# Patient Record
Sex: MALE | Race: OTHER | HISPANIC OR LATINO | URBAN - METROPOLITAN AREA
[De-identification: names, ages, dates, MRNs, and addresses within clinical notes are randomized per-mention and may not be internally consistent; named-entity substitution may affect disease eponyms.]

---

## 2019-03-18 ENCOUNTER — EMERGENCY (EMERGENCY)
Facility: HOSPITAL | Age: 29
LOS: 1 days | Discharge: HOME | End: 2019-03-18
Admitting: EMERGENCY MEDICINE
Payer: COMMERCIAL

## 2019-03-18 VITALS
OXYGEN SATURATION: 98 % | RESPIRATION RATE: 17 BRPM | SYSTOLIC BLOOD PRESSURE: 133 MMHG | WEIGHT: 259.93 LBS | DIASTOLIC BLOOD PRESSURE: 79 MMHG | TEMPERATURE: 98 F | HEART RATE: 65 BPM

## 2019-03-18 LAB
ALBUMIN SERPL ELPH-MCNC: 4.8 G/DL — SIGNIFICANT CHANGE UP (ref 3.3–5)
ALP SERPL-CCNC: 76 U/L — SIGNIFICANT CHANGE UP (ref 40–120)
ALT FLD-CCNC: 45 U/L — SIGNIFICANT CHANGE UP (ref 10–45)
ANION GAP SERPL CALC-SCNC: 12 MMOL/L — SIGNIFICANT CHANGE UP (ref 5–17)
AST SERPL-CCNC: 30 U/L — SIGNIFICANT CHANGE UP (ref 10–40)
BASOPHILS # BLD AUTO: 0.03 K/UL — SIGNIFICANT CHANGE UP (ref 0–0.2)
BASOPHILS NFR BLD AUTO: 0.4 % — SIGNIFICANT CHANGE UP (ref 0–2)
BILIRUB SERPL-MCNC: 0.9 MG/DL — SIGNIFICANT CHANGE UP (ref 0.2–1.2)
BUN SERPL-MCNC: 10 MG/DL — SIGNIFICANT CHANGE UP (ref 7–23)
CALCIUM SERPL-MCNC: 9.7 MG/DL — SIGNIFICANT CHANGE UP (ref 8.4–10.5)
CHLORIDE SERPL-SCNC: 104 MMOL/L — SIGNIFICANT CHANGE UP (ref 96–108)
CO2 SERPL-SCNC: 27 MMOL/L — SIGNIFICANT CHANGE UP (ref 22–31)
CREAT SERPL-MCNC: 1.23 MG/DL — SIGNIFICANT CHANGE UP (ref 0.5–1.3)
EOSINOPHIL # BLD AUTO: 0.29 K/UL — SIGNIFICANT CHANGE UP (ref 0–0.5)
EOSINOPHIL NFR BLD AUTO: 3.6 % — SIGNIFICANT CHANGE UP (ref 0–6)
GLUCOSE SERPL-MCNC: 98 MG/DL — SIGNIFICANT CHANGE UP (ref 70–99)
HCT VFR BLD CALC: 49.9 % — SIGNIFICANT CHANGE UP (ref 39–50)
HGB BLD-MCNC: 16.9 G/DL — SIGNIFICANT CHANGE UP (ref 13–17)
IMM GRANULOCYTES NFR BLD AUTO: 0.5 % — SIGNIFICANT CHANGE UP (ref 0–1.5)
LYMPHOCYTES # BLD AUTO: 2.47 K/UL — SIGNIFICANT CHANGE UP (ref 1–3.3)
LYMPHOCYTES # BLD AUTO: 30.6 % — SIGNIFICANT CHANGE UP (ref 13–44)
MCHC RBC-ENTMCNC: 30.1 PG — SIGNIFICANT CHANGE UP (ref 27–34)
MCHC RBC-ENTMCNC: 33.9 GM/DL — SIGNIFICANT CHANGE UP (ref 32–36)
MCV RBC AUTO: 88.8 FL — SIGNIFICANT CHANGE UP (ref 80–100)
MONOCYTES # BLD AUTO: 0.62 K/UL — SIGNIFICANT CHANGE UP (ref 0–0.9)
MONOCYTES NFR BLD AUTO: 7.7 % — SIGNIFICANT CHANGE UP (ref 2–14)
NEUTROPHILS # BLD AUTO: 4.61 K/UL — SIGNIFICANT CHANGE UP (ref 1.8–7.4)
NEUTROPHILS NFR BLD AUTO: 57.2 % — SIGNIFICANT CHANGE UP (ref 43–77)
NRBC # BLD: 0 /100 WBCS — SIGNIFICANT CHANGE UP (ref 0–0)
PLATELET # BLD AUTO: 361 K/UL — SIGNIFICANT CHANGE UP (ref 150–400)
POTASSIUM SERPL-MCNC: 4.5 MMOL/L — SIGNIFICANT CHANGE UP (ref 3.5–5.3)
POTASSIUM SERPL-SCNC: 4.5 MMOL/L — SIGNIFICANT CHANGE UP (ref 3.5–5.3)
PROT SERPL-MCNC: 8.5 G/DL — HIGH (ref 6–8.3)
RBC # BLD: 5.62 M/UL — SIGNIFICANT CHANGE UP (ref 4.2–5.8)
RBC # FLD: 13.2 % — SIGNIFICANT CHANGE UP (ref 10.3–14.5)
SODIUM SERPL-SCNC: 143 MMOL/L — SIGNIFICANT CHANGE UP (ref 135–145)
TSH SERPL-MCNC: 4.3 UIU/ML — SIGNIFICANT CHANGE UP (ref 0.35–4.94)
WBC # BLD: 8.06 K/UL — SIGNIFICANT CHANGE UP (ref 3.8–10.5)
WBC # FLD AUTO: 8.06 K/UL — SIGNIFICANT CHANGE UP (ref 3.8–10.5)

## 2019-03-18 PROCEDURE — 84443 ASSAY THYROID STIM HORMONE: CPT

## 2019-03-18 PROCEDURE — 99283 EMERGENCY DEPT VISIT LOW MDM: CPT

## 2019-03-18 PROCEDURE — 85025 COMPLETE CBC W/AUTO DIFF WBC: CPT

## 2019-03-18 PROCEDURE — 36415 COLL VENOUS BLD VENIPUNCTURE: CPT

## 2019-03-18 PROCEDURE — 99284 EMERGENCY DEPT VISIT MOD MDM: CPT

## 2019-03-18 PROCEDURE — 80053 COMPREHEN METABOLIC PANEL: CPT

## 2019-03-18 NOTE — ED PROVIDER NOTE - CLINICAL SUMMARY MEDICAL DECISION MAKING FREE TEXT BOX
throat pain x 3months. pain intermittent. labs noted. no evidence of infection on exam. motrin prn and will refer to ENT for further eval.

## 2019-03-18 NOTE — ED ADULT NURSE NOTE - OBJECTIVE STATEMENT
Pt presents to ED c/o throat pain. Pt reports throat pain x2 months, reports flew on an airplane ~4 months ago and experienced R ear pain at that time, though ear pain resolved and now with throat pain. Pt denies fevers/chills, cold symptoms, cough, difficulty swallowing, drooling, ear pain at this time. Pt presents in NAD speaking full sentences ambulatory through triage.

## 2019-03-18 NOTE — ED PROVIDER NOTE - ENMT, MLM
Airway patent, Nasal mucosa clear. Mouth with normal mucosa. Throat has no vesicles, no oropharyngeal exudates and uvula is midline. Airway patent, Nasal mucosa clear. Mouth with normal mucosa. Throat has no vesicles, no oropharyngeal exudates and uvula is midline. thyroid non tender. no enlargement.

## 2019-03-18 NOTE — ED ADULT TRIAGE NOTE - CHIEF COMPLAINT QUOTE
throat pain x2 months, no fevers/chills/cold sx, no SOB, no drooling, no dysphagia, speaking full and clear sentences in triage

## 2019-03-18 NOTE — ED PROVIDER NOTE - CARE PROVIDER_API CALL
Lily Ty)  Otolaryngology  [address] [place] [address], [address]  [place], [place] [address]  Phone: [phone]  Fax: [phone]  Follow Up Time:

## 2019-03-18 NOTE — ED PROVIDER NOTE - NSFOLLOWUPINSTRUCTIONS_ED_ALL_ED_FT
Follow up with Dr Ty in one week. motrin as needed for pain      Jericho LeslielishSpanish    Sore Throat  ImageA sore throat is pain, burning, irritation, or scratchiness in the throat. When you have a sore throat, you may feel pain or tenderness in your throat when you swallow or talk.    Many things can cause a sore throat, including:    An infection.  Seasonal allergies.  Dryness in the air.  Irritants, such as smoke or pollution.  Gastroesophageal reflux disease (GERD).  A tumor.    A sore throat is often the first sign of another sickness. It may happen with other symptoms, such as coughing, sneezing, fever, and swollen neck glands. Most sore throats go away without medical treatment.    Follow these instructions at home:  Take over-the-counter medicines only as told by your health care provider.  Drink enough fluids to keep your urine clear or pale yellow.  Rest as needed.  To help with pain, try:    Sipping warm liquids, such as broth, herbal tea, or warm water.  Eating or drinking cold or frozen liquids, such as frozen ice pops.  Gargling with a salt-water mixture 3–4 times a day or as needed. To make a salt-water mixture, completely dissolve ½–1 tsp of salt in 1 cup of warm water.  Sucking on hard candy or throat lozenges.  Putting a cool-mist humidifier in your bedroom at night to moisten the air.  Sitting in the bathroom with the door closed for 5–10 minutes while you run hot water in the shower.    Do not use any tobacco products, such as cigarettes, chewing tobacco, and e-cigarettes. If you need help quitting, ask your health care provider.  Contact a health care provider if:  You have a fever for more than 2–3 days.  You have symptoms that last (are persistent) for more than 2–3 days.  Your throat does not get better within 7 days.  You have a fever and your symptoms suddenly get worse.  Get help right away if:  You have difficulty breathing.  You cannot swallow fluids, soft foods, or your saliva.  You have increased swelling in your throat or neck.  You have persistent nausea and vomiting.  This information is not intended to replace advice given to you by your health care provider. Make sure you discuss any questions you have with your health care provider.    Document Released: 01/25/2006 Document Revised: 08/13/2017 Document Reviewed: 10/07/2016  [employer] Patient Education © 2019 [employer]

## 2019-03-18 NOTE — ED PROVIDER NOTE - OBJECTIVE STATEMENT
29 yo male with no sig PMHx c/o throat pain x 3 months. pt states intermittent pain to thyroid region. no change in voice. no difficulty swallowing. no fever or chills. no cp, sob, or wheezing. no cough.  no change in weight. no further complaints.

## 2019-03-22 DIAGNOSIS — R07.0 PAIN IN THROAT: ICD-10-CM

## 2023-01-05 ENCOUNTER — EMERGENCY (EMERGENCY)
Facility: HOSPITAL | Age: 33
LOS: 1 days | Discharge: HOME | End: 2023-01-05
Attending: EMERGENCY MEDICINE | Admitting: EMERGENCY MEDICINE
Payer: COMMERCIAL

## 2023-01-05 VITALS
HEART RATE: 90 BPM | DIASTOLIC BLOOD PRESSURE: 79 MMHG | TEMPERATURE: 98 F | RESPIRATION RATE: 18 BRPM | OXYGEN SATURATION: 98 % | SYSTOLIC BLOOD PRESSURE: 125 MMHG | WEIGHT: 218.04 LBS

## 2023-01-05 VITALS
HEART RATE: 79 BPM | RESPIRATION RATE: 20 BRPM | OXYGEN SATURATION: 99 % | DIASTOLIC BLOOD PRESSURE: 70 MMHG | SYSTOLIC BLOOD PRESSURE: 122 MMHG | TEMPERATURE: 98 F

## 2023-01-05 DIAGNOSIS — Z90.3 ACQUIRED ABSENCE OF STOMACH [PART OF]: Chronic | ICD-10-CM

## 2023-01-05 PROBLEM — Z78.9 OTHER SPECIFIED HEALTH STATUS: Chronic | Status: ACTIVE | Noted: 2019-03-18

## 2023-01-05 LAB
FLUAV AG NPH QL: SIGNIFICANT CHANGE UP
FLUBV AG NPH QL: SIGNIFICANT CHANGE UP
RSV RNA NPH QL NAA+NON-PROBE: SIGNIFICANT CHANGE UP
SARS-COV-2 RNA SPEC QL NAA+PROBE: SIGNIFICANT CHANGE UP

## 2023-01-05 PROCEDURE — 99283 EMERGENCY DEPT VISIT LOW MDM: CPT | Mod: 25

## 2023-01-05 PROCEDURE — 71046 X-RAY EXAM CHEST 2 VIEWS: CPT | Mod: 26

## 2023-01-05 PROCEDURE — 99284 EMERGENCY DEPT VISIT MOD MDM: CPT

## 2023-01-05 PROCEDURE — 71046 X-RAY EXAM CHEST 2 VIEWS: CPT

## 2023-01-05 PROCEDURE — 87637 SARSCOV2&INF A&B&RSV AMP PRB: CPT

## 2023-01-05 RX ORDER — BACITRACIN 500 [USP'U]/G
1 OINTMENT OPHTHALMIC
Qty: 1 | Refills: 0
Start: 2023-01-05 | End: 2023-01-14

## 2023-01-05 RX ORDER — BACITRACIN 500 [USP'U]/G
1 OINTMENT OPHTHALMIC ONCE
Refills: 0 | Status: COMPLETED | OUTPATIENT
Start: 2023-01-05 | End: 2023-01-05

## 2023-01-05 RX ORDER — PSEUDOEPHEDRINE HCL 30 MG
1 TABLET ORAL
Qty: 20 | Refills: 0
Start: 2023-01-05 | End: 2023-01-09

## 2023-01-05 RX ORDER — PSEUDOEPHEDRINE HCL 30 MG
30 TABLET ORAL ONCE
Refills: 0 | Status: COMPLETED | OUTPATIENT
Start: 2023-01-05 | End: 2023-01-05

## 2023-01-05 RX ADMIN — Medication 1 TABLET(S): at 04:53

## 2023-01-05 RX ADMIN — Medication 500 MILLIGRAM(S): at 04:53

## 2023-01-05 RX ADMIN — Medication 500 MILLIGRAM(S): at 05:34

## 2023-01-05 RX ADMIN — Medication 30 MILLIGRAM(S): at 04:53

## 2023-01-05 NOTE — ED ADULT NURSE NOTE - OBJECTIVE STATEMENT
Pt presented to ed with the c/o ear ache, redness in left eye, congestion and cough with yellowish ( jello like mucous ) and nasal congestion, on assessment pt has redness on his right eye, pt denies any discharge from the eyes and nose, pt is able to speak in full sentence, pt also endorse he is in severe pain, pt is not in any acute distress at this time.

## 2023-01-05 NOTE — ED PROVIDER NOTE - NSFOLLOWUPINSTRUCTIONS_ED_ALL_ED_FT
Sinusitis, Adult     Sinusitis is soreness and swelling (inflammation) of your sinuses. Sinuses are hollow spaces in the bones around your face. They are located:  •Around your eyes.    •In the middle of your forehead.    •Behind your nose.    •In your cheekbones.    Your sinuses and nasal passages are lined with a fluid called mucus. Mucus drains out of your sinuses. Swelling can trap mucus in your sinuses. This lets germs (bacteria, virus, or fungus) grow, which leads to infection. Most of the time, this condition is caused by a virus.    What are the causes?    This condition is caused by:  •Allergies.     •Asthma.     •Germs.    •Things that block your nose or sinuses.    •Growths in the nose (nasal polyps).    •Chemicals or irritants in the air.    •Fungus (rare).    What increases the risk?    You are more likely to develop this condition if:  •You have a weak body defense system (immune system).    •You do a lot of swimming or diving.    •You use nasal sprays too much.    •You smoke.    What are the signs or symptoms?    The main symptoms of this condition are pain and a feeling of pressure around the sinuses. Other symptoms include:  •Stuffy nose (congestion).    •Runny nose (drainage).    •Swelling and warmth in the sinuses.    •Headache.     •Toothache.    •A cough that may get worse at night.    •Mucus that collects in the throat or the back of the nose (postnasal drip).    •Being unable to smell and taste.    •Being very tired (fatigue).    •A fever.    •Sore throat.    •Bad breath.     How is this diagnosed?    This condition is diagnosed based on:  •Your symptoms.     •Your medical history.     •A physical exam.     •Tests to find out if your condition is short-term (acute) or long-term (chronic). Your doctor may:  •Check your nose for growths (polyps).    •Check your sinuses using a tool that has a light (endoscope).    •Check for allergies or germs.    •Do imaging tests, such as an MRI or CT scan.    How is this treated?    Treatment for this condition depends on the cause and whether it is short-term or long-term.•If caused by a virus, your symptoms should go away on their own within 10 days. You may be given medicines to relieve symptoms. They include:  •Medicines that shrink swollen tissue in the nose.     •Medicines that treat allergies (antihistamines).     •A spray that treats swelling of the nostrils.     •Rinses that help get rid of thick mucus in your nose (nasal saline washes).     •If caused by bacteria, your doctor may wait to see if you will get better without treatment. You may be given antibiotic medicine if you have:  •A very bad infection.    •A weak body defense system.    •If caused by growths in the nose, you may need to have surgery.    Follow these instructions at home:    Medicines     •Take, use, or apply over-the-counter and prescription medicines only as told by your doctor. These may include nasal sprays.    •If you were prescribed an antibiotic medicine, take it as told by your doctor. Do not stop taking the antibiotic even if you start to feel better.     •Drink enough water to keep your pee (urine) pale yellow.    •Use a cool mist humidifier to keep the humidity level in your home above 50%.    •Breathe in steam for 10–15 minutes, 3–4 times a day, or as told by your doctor. You can do this in the bathroom while a hot shower is running.    •Try not to spend time in cool or dry air.    Rest     •Rest as much as you can.    •Sleep with your head raised (elevated).    •Make sure you get enough sleep each night.    General instructions      •Put a warm, moist washcloth on your face 3–4 times a day, or as often as told by your doctor. This will help with discomfort.    •Wash your hands often with soap and water. If there is no soap and water, use hand .    • Do not smoke. Avoid being around people who are smoking (secondhand smoke).    •Keep all follow-up visits as told by your doctor. This is important.    Contact a doctor if:    •You have a fever.    •Your symptoms get worse.    •Your symptoms do not get better within 10 days.    Get help right away if:    •You have a very bad headache.    •You cannot stop throwing up (vomiting).    •You have very bad pain or swelling around your face or eyes.    •You have trouble seeing.    •You feel confused.    •Your neck is stiff.    •You have trouble breathing.    Summary    •Sinusitis is swelling of your sinuses. Sinuses are hollow spaces in the bones around your face.    •This condition is caused by tissues in your nose that become inflamed or swollen. This traps germs. These can lead to infection.    •If you were prescribed an antibiotic medicine, take it as told by your doctor. Do not stop taking it even if you start to feel better.    •Keep all follow-up visits as told by your doctor. This is important.    This information is not intended to replace advice given to you by your health care provider. Make sure you discuss any questions you have with your health care provider.      Sinusitis, en adultos     La sinusitis es el dolor y la hinchazón (inflamación) de los senos paranasales. Los senos paranasales son espacios vacíos en los huesos alrededor del feliberto. Estos se encuentran en los siguientes lugares:  •Alrededor de los ojos.    •En la mitad de la frente.    •Detrás de la nariz.    •En los pómulos.    Los senos paranasales y las fosas nasales están cubiertos de un líquido llamado mucosidad. La mucosidad drena a través de los senos paranasales. La hinchazón puede atrapar mucosidad en los senos paranasales. [place] permite que se desarrollen gérmenes (bacterias, virus u hongos), lo que produce infecciones. La mayoría de las veces, la causa de esta afección es un virus.    ¿Cuáles son las causas?    Las causas de esta afección son:  •Alergias.     •Asma.     •Gérmenes.    •Objetos que obstruyen la nariz o los senos paranasales.    •Crecimientos en el interior de la nariz (pólipos nasales).    •Sustancias químicas o irritantes que están presentes en el aire.    •Hongos (poco frecuente).    ¿Qué incrementa el riesgo?    Es más probable que contraiga esta afección si:  •Tiene debilitado el sistema de defensa del organismo (sistema inmunitario).    •Nada o bucea mucho.    •Usa aerosoles nasales en exceso.    •Fuma.    ¿Cuáles son los signos o los síntomas?    Los principales síntomas de esta afección son dolor y sensación de presión alrededor de los senos paranasales. Otros síntomas pueden incluir los siguientes:  •Nariz tapada (congestión nasal).    •Goteo nasal (drenaje).    •Hinchazón y calor en los senos paranasales.    •Dolor de maryse.     •Dolor dental.    •Tos que puede empeorar por la noche.    •Mucosidad que se acumula en la garganta o la parte posterior de la nariz (goteo posnasal).    •Incapacidad de sentir olores y sabores.    •Estar muy cansado (fatiga).    •Fiebre.    •Dolor de garganta.    •Mal aliento.     ¿Cómo se diagnostica?    Esta afección se diagnostica en función de lo siguiente:  •Haley síntomas.     •Haley antecedentes médicos.     •Un examen físico.     •Pruebas para averiguar si la afección es de corta duración (aguda) o de larga duración (crónica). El médico puede:  •Revisarle la nariz para detectar crecimientos (pólipos).    •Revisarle los senos paranasales con anibal herramienta que tiene anibal delano (endoscopio).    •Revisar si tiene alergias o gérmenes.    •Hacerle pruebas de diagnóstico por imágenes, malena anibal resonancia magnética (RM) o exploración por tomografía computarizada (TC).    ¿Cómo se trata?    El tratamiento de esta afección depende de la causa y si es de corta o de larga duración.•Si la causa es un virus, los síntomas deberían desaparecer solos en el término de 10 días. Pueden darle medicamentos para aliviar los síntomas. Entre ellos, se incluyen los siguientes:  •Medicamentos para encoger el tejido inflamado de la nariz.     •Medicamentos para tratar alergias (antihistamínicos).     •Un aerosol para tratar la hinchazón de las fosas nasales.     •Enjuagues que ayudan a eliminar la mucosidad espesa de la nariz (lavados con solución salina nasal).     •Si la causa es anibal bacteria, es posible que el médico espere para averiguar si usted mejora sin tratamiento. Es posible que le den un antibiótico si usted tiene:  •Anibal infección grave.    •El sistema de defensa del organismo debilitado.    •Si la causa son crecimientos en la nariz, es posible que necesite anibal cirugía.    Siga estas indicaciones en prado casa:    Medicamentos     •[place], use o aplique los medicamentos de venta patti y los recetados solamente malena se lo haya indicado el médico. Estos pueden incluir aerosoles nasales.    •Si le recetaron un antibiótico, tómelo malena se lo haya indicado el médico. No deje de rafael los antibióticos aunque comience a sentirse mejor.    •Kelly suficiente agua para mantener el pis (la orina) de color amarillo pálido.    •Use un humidificador de vapor frío para mantener la humedad de prado hogar por encima del 50 %.    •Inhale vapor odin 10 a 15 minutos, de 3 a 4 veces al día, o malena se lo haya indicado el médico. Puede hacer esto en el baño con el vapor del [place] de la ducha.    •Trate de no exponerse al aire frío o seco.    Reposo     •Descanse todo lo posible.    •Duerma con la maryse levantada (elevada).    •Asegúrese de dormir lo suficiente cada noche.    Indicaciones generales      •Póngase un paño caliente y húmedo en el feliberto 3 a 4 veces al día, o con la frecuencia indicada por el médico. [place] ayuda a calmar las molestias.    •Lávese las lona frecuentemente con agua y jabón. Use un desinfectante para lona si no dispone de agua y jabón.    • No fume. Evite estar cerca de personas que fuman (fumador pasivo).    •Concurra a todas las visitas de seguimiento malena se lo haya indicado el médico. [place] es importante.    Comuníquese con un médico si:    •Tiene fiebre.    •Haley síntomas empeoran.    •Los síntomas no mejoran en el período de 10 días.    Solicite ayuda inmediatamente si:    •Tiene un dolor de maryse muy intenso.    •No puede dejar de vomitar.    •Tiene dolor muy intenso o hinchazón en la leonides del feliberto o los ojos.    •Tiene dificultad para kirstin.    •Se siente confundido.    •Tiene el ayaz rígido.    •Tiene dificultad para respirar.    Resumen    •La sinusitis es la hinchazón de los senos paranasales. Los senos paranasales son espacios vacíos en los huesos alrededor del feliberto.    •La causa de esta afección es la inflamación o hinchazón de los tejidos que están en el interior de la nariz. [place] hace que los gérmenes queden atrapados. Los gérmenes pueden provocar infecciones.    •Si le recetaron un antibiótico, tómelo malena se lo haya indicado el médico. No deje de tomarlo aunque comience a sentirse mejor.    •Concurra a todas las visitas de seguimiento malena se lo haya indicado el médico. [place] es importante.    Esta información no tiene malena fin reemplazar el consejo del médico. Asegúrese de hacerle al médico cualquier pregunta que tenga.      Conjunctivitis    WHAT YOU NEED TO KNOW:    Conjunctivitis, or pink eye, is inflammation of your conjunctiva. The conjunctiva is a thin tissue that covers the front of your eye and the back of your eyelids. The conjunctiva helps protect your eye and keep it moist. Conjunctivitis may be caused by bacteria, allergies, or a virus. If your conjunctivitis is caused by bacteria, it may get better on its own in about 7 days. Viral conjunctivitis can last up to 3 weeks.     DISCHARGE INSTRUCTIONS:    Return to the emergency department if:   •You have worsening eye pain.     •The swelling in your eye gets worse, even after treatment.     •Your vision suddenly becomes worse or you cannot see at all.    Call your doctor if:   •You develop a fever and ear pain.    •You have tiny bumps or spots of blood on your eye.    •You have questions or concerns about your condition or care.    Manage your symptoms:   •Apply a cool compress. Wet a washcloth with cold water and place it on your eye. This will help decrease itching and irritation.    •Do not wear contact lenses. They can irritate your eye. Throw away the pair you are using and ask when you can wear them again. Use a new pair of lenses when your provider says it is okay.     •Avoid irritants. Stay away from smoke filled areas. Shield your eyes from wind and sun.     •Flush your eye. You may need to flush your eye with saline to help decrease your symptoms. Ask for more information on how to flush your eye.     Medicines: Treatment depends on what is causing your conjunctivitis. You maybe given any of the following:  •Allergy medicine helps decrease itchy, red, swollen eyes caused by allergies. It may be given as a pill, eye drops, or nasal spray.    •Antibiotics may be needed if your conjunctivitis is caused by bacteria. This medicine may be given as a pill, eye drops, or eye ointment.    •Take your medicine as directed. Contact your healthcare provider if you think your medicine is not helping or if you have side effects. Tell your provider if you are allergic to any medicine. Keep a list of the medicines, vitamins, and herbs you take. Include the amounts, and when and why you take them. Bring the list or the pill bottles to follow-up visits. Carry your medicine list with you in case of an emergency.    Prevent the spread of conjunctivitis:   •Wash your hands with soap and water often. Wash your hands before and after you touch your eyes. Also wash your hands before you prepare or eat food and after you use the bathroom or change a diaper.    •Avoid allergens. Try to avoid the things that cause your allergies, such as pets, dust, or grass.     •Avoid contact with others. Do not share towels or washcloths. Try to stay away from others as much as possible. Ask when you can return to work or school.     •Throw away eye makeup. The bacteria that caused your conjunctivitis can stay in eye makeup. Throw away your current mascara and other eye makeup. Never share mascara or other eye makeup with anyone.    Follow up with your doctor as directed: Write down your questions so you remember to ask them during your visits.      Conjuntivitis    LO QUE NECESITA SABER:    La conjuntivitis es la inflamación de la conjuntiva. La conjuntiva es el tejido vaca que cubre la parte frontal de prado nini y la parte posterior de haley párpados. La conjuntiva ayuda a proteger prado nini y a mantenerlo húmedo. La conjuntivitis podría ser a causa de anibal bacteria, alergias o de un virus. Si prado conjuntivitis es a causa de anibal bacteria, podría mejorar por sí ramon en aproximadamente 7 jennifer. La conjuntivitis viral puede durar hasta 3 semanas.    [facility]CCIONES [facility]BRE [facility]:    Regrese a la lucas de emergencias si:  •Prado dolor de nini empeora.    •La inflamación en haley ojos empeora, aún después del tratamiento.    •Prado visión empeora repentinamente o usted no puede kirstin en lo absoluto.    Llame a prado médico si:  •Usted presenta fiebre o dolor de oído.    •Usted tiene bultos o manchas de duy pequeñas en prado nini.    •Usted tiene preguntas o inquietudes acerca de prado condición o cuidado.    El manejo de haley síntomas:  •Aplique anibal compresa fría.Moje anibal toalla con agua fría y colóquela sobre prado nini. [place] ayuda a disminuir la comezón e irritación.    •No use lentes de contacto.Ellos podrían irritar haley ojos. Deseche el par que está usando y pregunte cuándo puede usarlos otra vez. Use un par de lentes nuevos cuando prado médico lo autorice.    •Evite los irritantes.Aléjese de las áreas llenas de humo. Proteja haley ojos del aire y del sol.    •Enjuague prado nini.Es posible que necesite enjuagar prado nini con solución salina para ayudar a disminuir haley síntomas. Pida más información sobre cómo enjuagar prado nini.    Medicamentos:El tratamiento depende de lo que está provocando la conjuntivitis. A usted  podrían  darle alguno de los siguientes:  •Medicamentos para la alergiaayuda a disminuir la comezón, el enrojecimiento y la inflamación de haley ojos a causa de las alergias. Se lo podrían nadeen en forma de píldora, gotas para los ojos o atomizador nasal.    •Los antibióticospodrían ser necesarios si prado conjuntivitis es a causa de anibal bacteria. Zaina medicamento podría ser en forma de píldora, gotas o pomada para los ojos.    •[place] haley medicamentos malena se le haya indicado.Consulte con prado médico si usted kei que prado medicamento no le está ayudando o si presenta efectos secundarios. Infórmele al médico si usted es alérgico a algún medicamento. Mantenga anibal lista actualizada de los medicamentos, las vitaminas y los productos herbales que akbar. Incluya los siguientes datos de los medicamentos: cantidad, frecuencia y motivo de administración. Traiga con usted la lista o los envases de las píldoras a haley citas de seguimiento. Lleve la lista de los medicamentos con usted en shan de anibal emergencia.    Evite la propagación de la conjuntivitis:  •Lávese haley lona con jabón y agua con frecuencia.Lávese las lona antes y después de tocarse los ojos. También lávese haley lona antes de preparar o comer alimentos y después de usar el baño o cambiar un pañal.    •Evite los alérgenos.Trate de evitar las cosas que le provoquen alergias, malena las mascotas, polvo o pasto.    •Evite el contacto con otras personas.No comparta las toallas o paños. Trate de permanecer lejos de otras personas tanto malena sea posible. Pregunte cuándo puede regresar al trabajo o a clase.    •Deseche el maquillaje de ojos.La bacteria que provoca la conjuntivitis puede estar en el maquillaje de ojos. Deseche el rímel y otros maquillajes de ojos que usa actualmente. Nunca comparta el rímel u otro tipo de maquillaje de ojos con nadnikkie.    Acuda a la consulta de control con prado médico según las indicaciones:Anote haley preguntas para que se acuerde de hacerlas odin haley visitas. Follow-up with your primary care physician    Sinusitis, Adult     Sinusitis is soreness and swelling (inflammation) of your sinuses. Sinuses are hollow spaces in the bones around your face. They are located:  •Around your eyes.    •In the middle of your forehead.    •Behind your nose.    •In your cheekbones.    Your sinuses and nasal passages are lined with a fluid called mucus. Mucus drains out of your sinuses. Swelling can trap mucus in your sinuses. This lets germs (bacteria, virus, or fungus) grow, which leads to infection. Most of the time, this condition is caused by a virus.    What are the causes?    This condition is caused by:  •Allergies.     •Asthma.     •Germs.    •Things that block your nose or sinuses.    •Growths in the nose (nasal polyps).    •Chemicals or irritants in the air.    •Fungus (rare).    What increases the risk?    You are more likely to develop this condition if:  •You have a weak body defense system (immune system).    •You do a lot of swimming or diving.    •You use nasal sprays too much.    •You smoke.    What are the signs or symptoms?    The main symptoms of this condition are pain and a feeling of pressure around the sinuses. Other symptoms include:  •Stuffy nose (congestion).    •Runny nose (drainage).    •Swelling and warmth in the sinuses.    •Headache.     •Toothache.    •A cough that may get worse at night.    •Mucus that collects in the throat or the back of the nose (postnasal drip).    •Being unable to smell and taste.    •Being very tired (fatigue).    •A fever.    •Sore throat.    •Bad breath.     How is this diagnosed?    This condition is diagnosed based on:  •Your symptoms.     •Your medical history.     •A physical exam.     •Tests to find out if your condition is short-term (acute) or long-term (chronic). Your doctor may:  •Check your nose for growths (polyps).    •Check your sinuses using a tool that has a light (endoscope).    •Check for allergies or germs.    •Do imaging tests, such as an MRI or CT scan.    How is this treated?    Treatment for this condition depends on the cause and whether it is short-term or long-term.•If caused by a virus, your symptoms should go away on their own within 10 days. You may be given medicines to relieve symptoms. They include:  •Medicines that shrink swollen tissue in the nose.     •Medicines that treat allergies (antihistamines).     •A spray that treats swelling of the nostrils.     •Rinses that help get rid of thick mucus in your nose (nasal saline washes).     •If caused by bacteria, your doctor may wait to see if you will get better without treatment. You may be given antibiotic medicine if you have:  •A very bad infection.    •A weak body defense system.    •If caused by growths in the nose, you may need to have surgery.    Follow these instructions at home:    Medicines     •Take, use, or apply over-the-counter and prescription medicines only as told by your doctor. These may include nasal sprays.    •If you were prescribed an antibiotic medicine, take it as told by your doctor. Do not stop taking the antibiotic even if you start to feel better.     •Drink enough water to keep your pee (urine) pale yellow.    •Use a cool mist humidifier to keep the humidity level in your home above 50%.    •Breathe in steam for 10–15 minutes, 3–4 times a day, or as told by your doctor. You can do this in the bathroom while a hot shower is running.    •Try not to spend time in cool or dry air.    Rest     •Rest as much as you can.    •Sleep with your head raised (elevated).    •Make sure you get enough sleep each night.    General instructions      •Put a warm, moist washcloth on your face 3–4 times a day, or as often as told by your doctor. This will help with discomfort.    •Wash your hands often with soap and water. If there is no soap and water, use hand .    • Do not smoke. Avoid being around people who are smoking (secondhand smoke).    •Keep all follow-up visits as told by your doctor. This is important.    Contact a doctor if:    •You have a fever.    •Your symptoms get worse.    •Your symptoms do not get better within 10 days.    Get help right away if:    •You have a very bad headache.    •You cannot stop throwing up (vomiting).    •You have very bad pain or swelling around your face or eyes.    •You have trouble seeing.    •You feel confused.    •Your neck is stiff.    •You have trouble breathing.    Summary    •Sinusitis is swelling of your sinuses. Sinuses are hollow spaces in the bones around your face.    •This condition is caused by tissues in your nose that become inflamed or swollen. This traps germs. These can lead to infection.    •If you were prescribed an antibiotic medicine, take it as told by your doctor. Do not stop taking it even if you start to feel better.    •Keep all follow-up visits as told by your doctor. This is important.    This information is not intended to replace advice given to you by your health care provider. Make sure you discuss any questions you have with your health care provider.      Sinusitis, en adultos     La sinusitis es el dolor y la hinchazón (inflamación) de los senos paranasales. Los senos paranasales son espacios vacíos en los huesos alrededor del feliberto. Estos se encuentran en los siguientes lugares:  •Alrededor de los ojos.    •En la mitad de la frente.    •Detrás de la nariz.    •En los pómulos.    Los senos paranasales y las fosas nasales están cubiertos de un líquido llamado mucosidad. La mucosidad drena a través de los senos paranasales. La hinchazón puede atrapar mucosidad en los senos paranasales. [place] permite que se desarrollen gérmenes (bacterias, virus u hongos), lo que produce infecciones. La mayoría de las veces, la causa de esta afección es un virus.    ¿Cuáles son las causas?    Las causas de esta afección son:  •Alergias.     •Asma.     •Gérmenes.    •Objetos que obstruyen la nariz o los senos paranasales.    •Crecimientos en el interior de la nariz (pólipos nasales).    •Sustancias químicas o irritantes que están presentes en el aire.    •Hongos (poco frecuente).    ¿Qué incrementa el riesgo?    Es más probable que contraiga esta afección si:  •Tiene debilitado el sistema de defensa del organismo (sistema inmunitario).    •Nada o bucea mucho.    •Usa aerosoles nasales en exceso.    •Fuma.    ¿Cuáles son los signos o los síntomas?    Los principales síntomas de esta afección son dolor y sensación de presión alrededor de los senos paranasales. Otros síntomas pueden incluir los siguientes:  •Nariz tapada (congestión nasal).    •Goteo nasal (drenaje).    •Hinchazón y calor en los senos paranasales.    •Dolor de maryse.     •Dolor dental.    •Tos que puede empeorar por la noche.    •Mucosidad que se acumula en la garganta o la parte posterior de la nariz (goteo posnasal).    •Incapacidad de sentir olores y sabores.    •Estar muy cansado (fatiga).    •Fiebre.    •Dolor de garganta.    •Mal aliento.     ¿Cómo se diagnostica?    Esta afección se diagnostica en función de lo siguiente:  •Haley síntomas.     •Haley antecedentes médicos.     •Un examen físico.     •Pruebas para averiguar si la afección es de corta duración (aguda) o de larga duración (crónica). El médico puede:  •Revisarle la nariz para detectar crecimientos (pólipos).    •Revisarle los senos paranasales con anibal herramienta que tiene anibal delano (endoscopio).    •Revisar si tiene alergias o gérmenes.    •Hacerle pruebas de diagnóstico por imágenes, malena anibal resonancia magnética (RM) o exploración por tomografía computarizada (TC).    ¿Cómo se trata?    El tratamiento de esta afección depende de la causa y si es de corta o de larga duración.•Si la causa es un virus, los síntomas deberían desaparecer solos en el término de 10 días. Pueden darle medicamentos para aliviar los síntomas. Entre ellos, se incluyen los siguientes:  •Medicamentos para encoger el tejido inflamado de la nariz.     •Medicamentos para tratar alergias (antihistamínicos).     •Un aerosol para tratar la hinchazón de las fosas nasales.     •Enjuagues que ayudan a eliminar la mucosidad espesa de la nariz (lavados con solución salina nasal).     •Si la causa es anibal bacteria, es posible que el médico espere para averiguar si usted mejora sin tratamiento. Es posible que le den un antibiótico si usted tiene:  •Anibal infección grave.    •El sistema de defensa del organismo debilitado.    •Si la causa son crecimientos en la nariz, es posible que necesite anibal cirugía.    Siga estas indicaciones en prado casa:    Medicamentos     •[place], use o aplique los medicamentos de venta patti y los recetados solamente malena se lo haya indicado el médico. Estos pueden incluir aerosoles nasales.    •Si le recetaron un antibiótico, tómelo malena se lo haya indicado el médico. No deje de rafael los antibióticos aunque comience a sentirse mejor.    •Kelly suficiente agua para mantener el pis (la orina) de color amarillo pálido.    •Use un humidificador de vapor frío para mantener la humedad de prado hogar por encima del 50 %.    •Inhale vapor odin 10 a 15 minutos, de 3 a 4 veces al día, o malena se lo haya indicado el médico. Puede hacer esto en el baño con el vapor del [place] de la ducha.    •Trate de no exponerse al aire frío o seco.    Reposo     •Descanse todo lo posible.    •Duerma con la maryse levantada (elevada).    •Asegúrese de dormir lo suficiente cada noche.    Indicaciones generales      •Póngase un paño caliente y húmedo en el feliberto 3 a 4 veces al día, o con la frecuencia indicada por el médico. [place] ayuda a calmar las molestias.    •Lávese las lona frecuentemente con agua y jabón. Use un desinfectante para lona si no dispone de agua y jabón.    • No fume. Evite estar cerca de personas que fuman (fumador pasivo).    •Concurra a todas las visitas de seguimiento malena se lo haya indicado el médico. [place] es importante.    Comuníquese con un médico si:    •Tiene fiebre.    •Haley síntomas empeoran.    •Los síntomas no mejoran en el período de 10 días.    Solicite ayuda inmediatamente si:    •Tiene un dolor de maryse muy intenso.    •No puede dejar de vomitar.    •Tiene dolor muy intenso o hinchazón en la leonides del feliberto o los ojos.    •Tiene dificultad para kirstin.    •Se siente confundido.    •Tiene el ayaz rígido.    •Tiene dificultad para respirar.    Resumen    •La sinusitis es la hinchazón de los senos paranasales. Los senos paranasales son espacios vacíos en los huesos alrededor del feliberto.    •La causa de esta afección es la inflamación o hinchazón de los tejidos que están en el interior de la nariz. [place] hace que los gérmenes queden atrapados. Los gérmenes pueden provocar infecciones.    •Si le recetaron un antibiótico, tómelo malena se lo haya indicado el médico. No deje de tomarlo aunque comience a sentirse mejor.    •Concurra a todas las visitas de seguimiento malena se lo haya indicado el médico. [place] es importante.    Esta información no tiene malena fin reemplazar el consejo del médico. Asegúrese de hacerle al médico cualquier pregunta que tenga.      Conjunctivitis    WHAT YOU NEED TO KNOW:    Conjunctivitis, or pink eye, is inflammation of your conjunctiva. The conjunctiva is a thin tissue that covers the front of your eye and the back of your eyelids. The conjunctiva helps protect your eye and keep it moist. Conjunctivitis may be caused by bacteria, allergies, or a virus. If your conjunctivitis is caused by bacteria, it may get better on its own in about 7 days. Viral conjunctivitis can last up to 3 weeks.     DISCHARGE INSTRUCTIONS:    Return to the emergency department if:   •You have worsening eye pain.     •The swelling in your eye gets worse, even after treatment.     •Your vision suddenly becomes worse or you cannot see at all.    Call your doctor if:   •You develop a fever and ear pain.    •You have tiny bumps or spots of blood on your eye.    •You have questions or concerns about your condition or care.    Manage your symptoms:   •Apply a cool compress. Wet a washcloth with cold water and place it on your eye. This will help decrease itching and irritation.    •Do not wear contact lenses. They can irritate your eye. Throw away the pair you are using and ask when you can wear them again. Use a new pair of lenses when your provider says it is okay.     •Avoid irritants. Stay away from smoke filled areas. Shield your eyes from wind and sun.     •Flush your eye. You may need to flush your eye with saline to help decrease your symptoms. Ask for more information on how to flush your eye.     Medicines: Treatment depends on what is causing your conjunctivitis. You maybe given any of the following:  •Allergy medicine helps decrease itchy, red, swollen eyes caused by allergies. It may be given as a pill, eye drops, or nasal spray.    •Antibiotics may be needed if your conjunctivitis is caused by bacteria. This medicine may be given as a pill, eye drops, or eye ointment.    •Take your medicine as directed. Contact your healthcare provider if you think your medicine is not helping or if you have side effects. Tell your provider if you are allergic to any medicine. Keep a list of the medicines, vitamins, and herbs you take. Include the amounts, and when and why you take them. Bring the list or the pill bottles to follow-up visits. Carry your medicine list with you in case of an emergency.    Prevent the spread of conjunctivitis:   •Wash your hands with soap and water often. Wash your hands before and after you touch your eyes. Also wash your hands before you prepare or eat food and after you use the bathroom or change a diaper.    •Avoid allergens. Try to avoid the things that cause your allergies, such as pets, dust, or grass.     •Avoid contact with others. Do not share towels or washcloths. Try to stay away from others as much as possible. Ask when you can return to work or school.     •Throw away eye makeup. The bacteria that caused your conjunctivitis can stay in eye makeup. Throw away your current mascara and other eye makeup. Never share mascara or other eye makeup with anyone.    Follow up with your doctor as directed: Write down your questions so you remember to ask them during your visits.      Conjuntivitis    LO QUE NECESITA SABER:    La conjuntivitis es la inflamación de la conjuntiva. La conjuntiva es el tejido vaca que cubre la parte frontal de prado nini y la parte posterior de haley párpados. La conjuntiva ayuda a proteger prado nini y a mantenerlo húmedo. La conjuntivitis podría ser a causa de anibal bacteria, alergias o de un virus. Si prado conjuntivitis es a causa de anibal bacteria, podría mejorar por sí ramon en aproximadamente 7 jennifer. La conjuntivitis viral puede durar hasta 3 semanas.    INSTRUCCIONES SOBRE EL [facility]OSPITALARIA:    Regrese a la lucas de emergencias si:  •Prado dolor de nini empeora.    •La inflamación en haley ojos empeora, aún después del tratamiento.    •Prado visión empeora repentinamente o usted no puede kirstin en lo absoluto.    Llame a prado médico si:  •Usted presenta fiebre o dolor de oído.    •Usted tiene bultos o manchas de duy pequeñas en prado nini.    •Usted tiene preguntas o inquietudes acerca de prado condición o cuidado.    El manejo de haley síntomas:  •Aplique anibal compresa fría.Moje anibal toalla con agua fría y colóquela sobre prado nini. [place] ayuda a disminuir la comezón e irritación.    •No use lentes de contacto.Ellos podrían irritar haley ojos. Deseche el par que está usando y pregunte cuándo puede usarlos otra vez. Use un par de lentes nuevos cuando prado médico lo autorice.    •Evite los irritantes.Aléjese de las áreas llenas de humo. Proteja haley ojos del aire y del sol.    •Enjuague prado nini.Es posible que necesite enjuagar prado nini con solución salina para ayudar a disminuir haley síntomas. Pida más información sobre cómo enjuagar prado nini.    Medicamentos:El tratamiento depende de lo que está provocando la conjuntivitis. A usted  podrían  darle alguno de los siguientes:  •Medicamentos para la alergiaayuda a disminuir la comezón, el enrojecimiento y la inflamación de haley ojos a causa de las alergias. Se lo podrían nadeen en forma de píldora, gotas para los ojos o atomizador nasal.    •Los antibióticospodrían ser necesarios si prado conjuntivitis es a causa de anibal bacteria. Zaina medicamento podría ser en forma de píldora, gotas o pomada para los ojos.    •[place] haley medicamentos malena se le haya indicado.Consulte con prado médico si usted kei que prado medicamento no le está ayudando o si presenta efectos secundarios. Infórmele al médico si usted es alérgico a algún medicamento. Mantenga anibal lista actualizada de los medicamentos, las vitaminas y los productos herbales que akbar. Incluya los siguientes datos de los medicamentos: cantidad, frecuencia y motivo de administración. Traiga con usted la lista o los envases de las píldoras a haley citas de seguimiento. Lleve la lista de los medicamentos con usted en shan de anibal emergencia.    Evite la propagación de la conjuntivitis:  •Lávese haley lona con jabón y agua con frecuencia.Lávese las lona antes y después de tocarse los ojos. También lávese haley lona antes de preparar o comer alimentos y después de usar el baño o cambiar un pañal.    •Evite los alérgenos.Trate de evitar las cosas que le provoquen alergias, malena las mascotas, polvo o pasto.    •Evite el contacto con otras personas.No comparta las toallas o paños. Trate de permanecer lejos de otras personas tanto malena sea posible. Pregunte cuándo puede regresar al trabajo o a clase.    •Deseche el maquillaje de ojos.La bacteria que provoca la conjuntivitis puede estar en el maquillaje de ojos. Deseche el rímel y otros maquillajes de ojos que usa actualmente. Nunca comparta el rímel u otro tipo de maquillaje de ojos con nadie.    Acuda a la consulta de control con prado médico según las indicaciones:Anote haley preguntas para que se acuerde de hacerlas odin haley visitas.

## 2023-01-05 NOTE — ED ADULT TRIAGE NOTE - SOURCE OF INFORMATION
Outreach attempt was made to schedule an Annual Wellness Visit. This was the first attempt. Contact was not made, no answer/busy.    SENT LETTER   Patient

## 2023-01-05 NOTE — ED ADULT TRIAGE NOTE - CHIEF COMPLAINT QUOTE
Cough, nasal congestion, SOB, ears ache, sinus pressure, fever since 2 weeks ago, Rt eye redness and d/c since yesterday.

## 2023-01-05 NOTE — ED PROVIDER NOTE - EYES, MLM
Clear bilaterally, pupils equal, round and reactive to light. +right - conjunctival injection, no chemosis, no FB

## 2023-01-05 NOTE — ED PROVIDER NOTE - PATIENT PORTAL LINK FT
You can access the FollowMyHealth Patient Portal offered by [facility] by registering at the following website: [email or web address]. By joining [employer]’s FollowMyHealth portal, you will also be able to view your health information using other applications (apps) compatible with our system.

## 2023-01-05 NOTE — ED ADULT NURSE NOTE - NSIMPLEMENTINTERV_GEN_ALL_ED
Implemented All Universal Safety Interventions:  [place] to call system. Call bell, personal items and telephone within reach. Instruct patient to call for assistance. Room bathroom lighting operational. Non-slip footwear when patient is off stretcher. Physically safe environment: no spills, clutter or unnecessary equipment. Stretcher in lowest position, wheels locked, appropriate side rails in place.

## 2023-01-05 NOTE — ED PROVIDER NOTE - PROGRESS NOTE DETAILS
no infiltrates on CXR, will treat for sinusitis. recommend f/u with PMD  I have discussed the discharge plan with the patient. The patient agrees with the plan, as discussed.  The patient understands Emergency Department diagnosis is a preliminary diagnosis often based on limited information and that the patient must adhere to the follow-up plan as discussed.  The patient understands that if the symptoms worsen or if prescribed medications do not have the desired/planned effect that the patient may return to the Emergency Department at any time for further evaluation and treatment.

## 2023-01-05 NOTE — ED PROVIDER NOTE - OBJECTIVE STATEMENT
32M no PMH c/o feeling unwell for past 2 weeks. pt states initially had fever and generalized malaise. +persistent cough and nasal congestion. states worsening facial pain and sinus congestion. states ears feel clogged. yesterday right eye became red with increased tearing. no vision changes. no contact use. no recent travel. no n/v/d. tried dayquil, tylenol sinus without relief. states wife was sick prior to him but only felt unwell for 2 days.

## 2023-01-05 NOTE — ED PROVIDER NOTE - CLINICAL SUMMARY MEDICAL DECISION MAKING FREE TEXT BOX
feeling unwell for past 2 weeks, worsening congestion, sinus pressure, cough, no resp distress, no hypoxia, afebrile.   check CXR to r/o PNA  check viral swab  likely sinus infection - treat with augmentin  eye redness - no visual changes, c/w conjunctivitis. no APD, no photophobia, doubt uveitis, doubt corneal abrasion

## 2023-01-08 DIAGNOSIS — Z98.84 BARIATRIC SURGERY STATUS: ICD-10-CM

## 2023-01-08 DIAGNOSIS — R50.9 FEVER, UNSPECIFIED: ICD-10-CM

## 2023-01-08 DIAGNOSIS — Z20.822 CONTACT WITH AND (SUSPECTED) EXPOSURE TO COVID-19: ICD-10-CM

## 2023-01-08 DIAGNOSIS — J32.9 CHRONIC SINUSITIS, UNSPECIFIED: ICD-10-CM

## 2023-02-28 NOTE — ED ADULT NURSE NOTE - PRIMARY CARE PROVIDER
Instructed patient/caregiver to follow-up with primary care physician./Verbal/written post procedure instructions were given to patient/caregiver./Keep the cast/splint/dressing clean and dry./Instructed patient/caregiver regarding signs and symptoms of infection./Elevate the injured extremity as instructed. PCP Retired

## 2024-02-07 NOTE — ED ADULT NURSE NOTE - NSSISCREENINGQ3_ED_A_ED
Procedures: Procedure(s):   PROSTATE BIOPSY FUSION   Date: 3/14/2024   Time: 1230   Location: [facility] MAIN OR 11    No

## 2024-02-28 ENCOUNTER — EMERGENCY (EMERGENCY)
Facility: HOSPITAL | Age: 34
LOS: 1 days | Discharge: HOME | End: 2024-02-28
Admitting: EMERGENCY MEDICINE
Payer: COMMERCIAL

## 2024-02-28 VITALS
HEIGHT: 71 IN | HEART RATE: 83 BPM | WEIGHT: 238.1 LBS | TEMPERATURE: 98 F | SYSTOLIC BLOOD PRESSURE: 144 MMHG | DIASTOLIC BLOOD PRESSURE: 88 MMHG | RESPIRATION RATE: 18 BRPM | OXYGEN SATURATION: 98 %

## 2024-02-28 DIAGNOSIS — M25.512 PAIN IN LEFT SHOULDER: ICD-10-CM

## 2024-02-28 DIAGNOSIS — Z90.3 ACQUIRED ABSENCE OF STOMACH [PART OF]: Chronic | ICD-10-CM

## 2024-02-28 PROCEDURE — 73030 X-RAY EXAM OF SHOULDER: CPT

## 2024-02-28 PROCEDURE — 99283 EMERGENCY DEPT VISIT LOW MDM: CPT | Mod: 25

## 2024-02-28 PROCEDURE — 73030 X-RAY EXAM OF SHOULDER: CPT | Mod: 26

## 2024-02-28 PROCEDURE — 96372 THER/PROPH/DIAG INJ SC/IM: CPT

## 2024-02-28 PROCEDURE — 99284 EMERGENCY DEPT VISIT MOD MDM: CPT

## 2024-02-28 RX ORDER — KETOROLAC TROMETHAMINE 30 MG/ML
15 SYRINGE (ML) INJECTION ONCE
Refills: 0 | Status: DISCONTINUED | OUTPATIENT
Start: 2024-02-28 | End: 2024-02-28

## 2024-02-28 RX ORDER — IBUPROFEN 200 MG
1 TABLET ORAL
Qty: 20 | Refills: 0
Start: 2024-02-28 | End: 2024-03-03

## 2024-02-28 RX ORDER — ACETAMINOPHEN 500 MG
975 TABLET ORAL ONCE
Refills: 0 | Status: COMPLETED | OUTPATIENT
Start: 2024-02-28 | End: 2024-02-28

## 2024-02-28 RX ADMIN — Medication 15 MILLIGRAM(S): at 08:25

## 2024-02-28 RX ADMIN — Medication 975 MILLIGRAM(S): at 08:25

## 2024-02-28 NOTE — ED PROVIDER NOTE - CARE PROVIDER_API CALL
Bam Ramirez  Orthopaedic Sports Medicine  [address]  [place] [address]  Phone: [phone]  Fax: [phone]  Follow Up Time:

## 2024-02-28 NOTE — ED PROVIDER NOTE - NSFOLLOWUPINSTRUCTIONS_ED_ALL_ED_FT
Thank you for visiting [facility] Emergency Department.      We saw you today for shoulder pain.    PAIN CONTROL:   You may take ibuprofen (Motrin, Advil) 600 mg (3 regular tablets) every 6 hours as needed for pain.  Please take with food.  Stop taking if you develop abdominal pain, dark/ bloody stools.  Do not mix with other NSAIDS (ie. Naproxen, Aleve, Celecoxib).  You may also take acetaminophen (Tylenol) 650-975mg (2-3 regular tablets) or 500-1000mg (1-2 extra strength tablets) every 6 hours as needed for pain.  Do not exceed 4000 mg in 1 day. These medications may be bought over the counter.    I recommend alternating the Ibuprofen and Tylenol so you are getting medications around the clock.  For example take the Ibuprofen, then 3 hours later take the Tylenol, then 3 hours later take the Ibuprofen, and repeat as needed.    Rest. Apply ice to affected area 20 minutes on, then 20 minutes off.  You may repeat throughout the day.  Please wear ACE wrap as instructed. Elevate affected extremity.    If your pain does not improve or worsens despite these measures, you should seek medical attention and/or may need to follow up with an orthopedist.    Please know that no emergency visit is complete without follow-up with your primary care provider in 1 week.  Please bring copies of all discharge papers and results and show to your doctor.      Please continue taking all previous medications as instructed unless we discussed otherwise.     I appreciated your patience and hope you feel better soon.     Return to ER immediately if you develop fevers, chills, chest pain, shortness of breath, worsening and/or any concerning symptoms.

## 2024-02-28 NOTE — ED PROVIDER NOTE - PATIENT PORTAL LINK FT
You can access the FollowMyHealth Patient Portal offered by [facility] by registering at the following website: [email or web address]. By joining [employer]’s FollowMyHealth portal, you will also be able to view your health information using other applications (apps) compatible with our system.

## 2024-02-28 NOTE — ED PROVIDER NOTE - OBJECTIVE STATEMENT
34 y/o male w/ hx gastric sleeve p/w L shoulder pain x 3 days which started while driving.  Worse with movement and states having limited ROM to shoulder 2/2 pain.  Denies known trauma/injury.  States does workout regularly and was lifting weights last week.  Denies fever, ha, neck pain, cp, sob, numbness/tingling/weakness to ext.  Tried tylenol x 1, without much relief.  Pt is R hand dominant.

## 2024-02-28 NOTE — ED PROVIDER NOTE - CLINICAL SUMMARY MEDICAL DECISION MAKING FREE TEXT BOX
32 y/o male w/ hx gastric sleeve p/w L shoulder pain x 3 days which started while driving.  Worse with movement and states having limited ROM to shoulder 2/2 pain.  Denies known trauma/injury.  States does workout regularly and was lifting weights last week.  Denies fever, ha, neck pain, cp, sob, numbness/tingling/weakness to ext.  Tried tylenol x 1, without much relief.  Pt is R hand dominant.  Exam notable for ttp to L GH joint and limited rom 2/2 pain  Seems likely msk in nature  Doubt cardiac etiology, doubt infx  Plan for xr, pain meds  Refer to ortho

## 2024-02-28 NOTE — ED PROVIDER NOTE - PHYSICAL EXAMINATION
CONSTITUTIONAL: Awake, alert.  Nontoxic, no acute distress.    HEAD: Normocephalic, atraumatic.    NECK: supple, trachea midline.  No midline or paraspinal ttp    HEART:  Normal rate, regular rhythm.  Heart sounds S1, S2.  No murmurs, rubs or gallops.    LUNGS:  No acute respiratory distress.  Non-tachypneic and non-labored.  Lungs are clear bilaterally with good aeration.  No wheezing, rales, rhonchi.    MUSCULOSKELETAL: Normal appearing extremities without obvious deformity, rash, ecchymosis, erythema.  No swelling.  Warm. +ttp to L glenohumeral joint.  Decreased rom to L shoulder 2/2 pain.  5/5  strength b/l.  Sensation and motor function grossly intact.  Strong equal peripheral pulses b/l.   Cap refill < 2 b/l upper and lower ext.  All compartments soft.    SKIN: Skin in warm, dry and intact without rashes or lesions.  Appropriate color for ethnicity.    NEUROLOGICAL:  Patient is alert, oriented x person, place and time.    PSYCH: Appropriate mood and affect. Good judgment and insight.

## 2024-02-28 NOTE — ED ADULT NURSE NOTE - NSFALLUNIVINTERV_ED_ALL_ED
Bed/Stretcher in lowest position, wheels locked, appropriate side rails in place/Call bell, personal items and telephone in reach/Instruct patient to call for assistance before getting out of bed/chair/stretcher/Non-slip footwear applied when patient is off stretcher/[place] to call system/Physically safe environment - no spills, clutter or unnecessary equipment/Purposeful proactive rounding/Room/bathroom lighting operational, light cord in reach

## 2024-02-28 NOTE — ED ADULT NURSE NOTE - OBJECTIVE STATEMENT
pt. presents with c/o Lt shoulder and arm pain and LROM since monday. pt. denies any trauma, states he worked out last week but the pain started several days later. pt. is A&Ox3, communicates w/o difficulty, states that pain is severe and he can not sleep b/c of it.

## 2024-02-28 NOTE — ED ADULT TRIAGE NOTE - CHIEF COMPLAINT QUOTE
Pt, with hx of gastric sleeve, presents to ER c/o atraumatic left shoulder pain with decreased ROM since Monday. Pt reports pt started while driving. Pt denies any other associated symptoms at this time.

## 2025-07-17 NOTE — ED ADULT TRIAGE NOTE - NS ED TRIAGE AVPU SCALE
Alert-The patient is alert, awake and responds to voice. The patient is oriented to time, place, and person. The triage nurse is able to obtain subjective information. 1.59